# Patient Record
(demographics unavailable — no encounter records)

---

## 2025-05-13 NOTE — HISTORY OF PRESENT ILLNESS
[FreeTextEntry1] : Patient is a 67-year-old male here today for evaluation of left knee pain.  He is previously under the care of another provider.  Undergone an arthroscopy in July.  States that he has never recovered.  States he still has pain in the knee.  Hurts company of stairs rising to seated position.  Feels like the knee is jelani buckle on him.  Feels a catching.  Hurts over the medial aspect.  Had a recent MRI that showed a complex tear of his medial meniscus from February.  States that his previous surgeon is moving offices and is here today to establish care

## 2025-05-13 NOTE — DISCUSSION/SUMMARY
[Medication Risks Reviewed] : Medication risks reviewed [Surgical risks reviewed] : Surgical risks reviewed [de-identified] : Patient is a 67-year-old male here today for evaluation of severe left knee pain is going on for some time related to a Worker's Comp. injury.  After discussion with him about conservative or surgical treatment options.  At this time he is hesitant to proceed with any type of surgical intervention as he states that the recovery from his previous arthroscopy was difficult.  He has had multiple injections with the most recent injection being in April.  Continue with low impact activity exercise.  New prescription for physical therapy was provided.  Continues diclofenac gel.  I will see him back in 2 months for repeat evaluation possible surgical schedule if he continues to have pain.  All questions were asked and answered

## 2025-05-13 NOTE — PHYSICAL EXAM
[de-identified] : GENERAL APPEARANCE: Well nourished and hydrated, pleasant, alert, and oriented x 3. Appears their stated age.  HEENT: Normocephalic, extraocular eye motion intact. Nasal septum midline. Oral cavity clear. External auditory canal clear.  RESPIRATORY: Breath sounds clear and audible in all lobes. No wheezing, No accessory muscle use. CARDIOVASCULAR: No apparent abnormalities. No lower leg edema. No varicosities. Pedal pulses are palpable. NEUROLOGIC: Sensation is normal, no muscle weakness in the upper or lower extremities. DERMATOLOGIC: No apparent skin lesions, moist, warm, no rash. SPINE: Cervical spine appears normal and moves freely; thoracic spine appears normal and moves freely; lumbosacral spine appears normal and moves freely, normal, nontender. MUSCULOSKELETAL: Hands, wrists, and elbows are normal and move freely, shoulders are normal and move freely.  Psychiatric: Oriented to person, place, and time, insight and judgement were intact and the affect was normal.  Musculoskeletal:. Left knee exam shows mild effusion, ROM is 5-1 10 degrees, no instability, pain with Emmie, medial joint line tenderness.  5/5 motor strength in bilateral lower extremities. Sensory: Intact in bilateral lower extremities. DTRs: Biceps, brachioradialis, triceps, patellar, ankle and plantar 2+ and symmetric bilaterally. Pulses: dorsalis pedis, posterior tibial, femoral, popliteal, and radial 2+ and symmetric bilaterally.  Constitutional: Alert and in no acute distress, but well-appearing.  [de-identified] : 4 views of the left knee obtained the office today show no acute fracture or dislocation.  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